# Patient Record
Sex: FEMALE | Race: WHITE | URBAN - METROPOLITAN AREA
[De-identification: names, ages, dates, MRNs, and addresses within clinical notes are randomized per-mention and may not be internally consistent; named-entity substitution may affect disease eponyms.]

---

## 2021-07-26 ENCOUNTER — OFFICE VISIT (OUTPATIENT)
Dept: URBAN - METROPOLITAN AREA CLINIC 94 | Facility: CLINIC | Age: 56
End: 2021-07-26
Payer: COMMERCIAL

## 2021-07-26 ENCOUNTER — DASHBOARD ENCOUNTERS (OUTPATIENT)
Age: 56
End: 2021-07-26

## 2021-07-26 ENCOUNTER — WEB ENCOUNTER (OUTPATIENT)
Dept: URBAN - METROPOLITAN AREA CLINIC 94 | Facility: CLINIC | Age: 56
End: 2021-07-26

## 2021-07-26 VITALS
WEIGHT: 139 LBS | DIASTOLIC BLOOD PRESSURE: 79 MMHG | TEMPERATURE: 99 F | SYSTOLIC BLOOD PRESSURE: 127 MMHG | HEIGHT: 63 IN | BODY MASS INDEX: 24.63 KG/M2 | HEART RATE: 79 BPM

## 2021-07-26 DIAGNOSIS — K70.31 ASCITES DUE TO ALCOHOLIC CIRRHOSIS: ICD-10-CM

## 2021-07-26 DIAGNOSIS — I85.00 ESOPHAGEAL VARICES WITHOUT BLEEDING, UNSPECIFIED ESOPHAGEAL VARICES TYPE: ICD-10-CM

## 2021-07-26 DIAGNOSIS — K74.60 UNSPECIFIED CIRRHOSIS OF LIVER: ICD-10-CM

## 2021-07-26 PROCEDURE — 99244 OFF/OP CNSLTJ NEW/EST MOD 40: CPT | Performed by: INTERNAL MEDICINE

## 2021-07-26 PROCEDURE — 74177 CT ABD & PELVIS W/CONTRAST: CPT | Performed by: INTERNAL MEDICINE

## 2021-07-26 RX ORDER — POTASSIUM CHLORIDE 7.46 G/1000ML
AS DIRECTED INJECTION, SOLUTION INTRAVENOUS
Status: ACTIVE | COMMUNITY

## 2021-07-26 RX ORDER — PROPRANOLOL HYDROCHLORIDE 20 MG/1
1 TABLET TABLET ORAL ONCE A DAY
Status: ACTIVE | COMMUNITY

## 2021-07-26 RX ORDER — SPIRONOLACTONE 50 MG/1
1 TABLET TABLET, FILM COATED ORAL ONCE A DAY
Status: ACTIVE | COMMUNITY

## 2021-07-26 RX ORDER — PANTOPRAZOLE SODIUM 40 MG/1
1 TABLET TABLET, DELAYED RELEASE ORAL ONCE A DAY
Status: ACTIVE | COMMUNITY

## 2021-07-26 NOTE — PHYSICAL EXAM GASTROINTESTINAL
Abdomen diffuse tender, moderately distended , no guarding or rigidity , no masses palpable , normal bowel sounds , Liver and Spleen , hepatomegaly present , liver nontender , spleen not palpable

## 2021-07-26 NOTE — PHYSICAL EXAM CONSTITUTIONAL:
ill appearing , in no acute distress , ambulating without difficulty , normal communication ability

## 2021-07-26 NOTE — HPI-TODAY'S VISIT:
55 yo F accompanied by her  with hx of liver cirrhosis with hx of ETOH use drinking 6pk beer/day per [facility] records which were reviewed prior to office visit. Patient is being referrd by her PCP, DR Sagar Butt and copy of records will be faxed to his office for review.  Pt reports since May, she has developed significant ascites requiring weekly paracentesis. She has had to present to the ER for this and is hoping to get an order to have paracentesis as an outpatient.   The patient denies current ETOH use. She is currently taking Spirinolactone 50 mg, potassium 10 meq, Pantoprazole 40 mg and Mg. Pt and spouse are unable to previous hx.   Pt reports abdominal pain, large volume ascites. Denies N/V or itching, or changes in bowel habits, melena or hematochezia.    Pt was seeing GI DR Corbin Peralta, Colonoscopy 2017 normal Repeat in 10 yrs EGD 8/2017 Gastritis  Esophageal varices without bleeding, grade 1 nonbandable, Gastritis Helicobacter negative  Negative for malignancy  No new imaging noted in [facility] records other than US for paracentesis . Last paracentesis 7/16 7 L removed. Pt has been receiving Albumin 50 gm following paracentesis.   Pt denies NSAID use. Additional PMH HTN and arthritis. She believes that she received Hep A/B vaccines.

## 2021-07-26 NOTE — PHYSICAL EXAM HENT:
Head, normocephalic, atraumatic, Face erthematous dime size nodular lesion over left cheek, Ears, External ears within normal limits, Nose/Nasopharynx, External nose normal appearance, nares patent, no nasal discharge, Mouth and Throat, Oral cavity appearance normal, Breath odor normal, Lips, Appearance normal

## 2021-07-27 ENCOUNTER — TELEPHONE ENCOUNTER (OUTPATIENT)
Dept: URBAN - METROPOLITAN AREA CLINIC 94 | Facility: CLINIC | Age: 56
End: 2021-07-27

## 2021-07-27 PROBLEM — 1082601000119104: Status: ACTIVE | Noted: 2021-07-26

## 2021-07-27 PROBLEM — 14223005: Status: ACTIVE | Noted: 2021-07-26

## 2021-07-27 PROBLEM — 19943007: Status: ACTIVE | Noted: 2021-07-26

## 2021-07-29 ENCOUNTER — TELEPHONE ENCOUNTER (OUTPATIENT)
Dept: URBAN - METROPOLITAN AREA CLINIC 94 | Facility: CLINIC | Age: 56
End: 2021-07-29

## 2021-08-02 ENCOUNTER — TELEPHONE ENCOUNTER (OUTPATIENT)
Dept: URBAN - METROPOLITAN AREA CLINIC 94 | Facility: CLINIC | Age: 56
End: 2021-08-02

## 2021-08-10 ENCOUNTER — LAB OUTSIDE AN ENCOUNTER (OUTPATIENT)
Dept: URBAN - METROPOLITAN AREA CLINIC 94 | Facility: CLINIC | Age: 56
End: 2021-08-10

## 2021-08-24 ENCOUNTER — LAB OUTSIDE AN ENCOUNTER (OUTPATIENT)
Dept: URBAN - METROPOLITAN AREA CLINIC 94 | Facility: CLINIC | Age: 56
End: 2021-08-24

## 2021-09-07 ENCOUNTER — LAB OUTSIDE AN ENCOUNTER (OUTPATIENT)
Dept: URBAN - METROPOLITAN AREA CLINIC 94 | Facility: CLINIC | Age: 56
End: 2021-09-07

## 2021-09-13 ENCOUNTER — OFFICE VISIT (OUTPATIENT)
Dept: URBAN - METROPOLITAN AREA CLINIC 94 | Facility: CLINIC | Age: 56
End: 2021-09-13

## 2021-09-21 ENCOUNTER — LAB OUTSIDE AN ENCOUNTER (OUTPATIENT)
Dept: URBAN - METROPOLITAN AREA CLINIC 94 | Facility: CLINIC | Age: 56
End: 2021-09-21

## 2021-10-05 ENCOUNTER — LAB OUTSIDE AN ENCOUNTER (OUTPATIENT)
Dept: URBAN - METROPOLITAN AREA CLINIC 94 | Facility: CLINIC | Age: 56
End: 2021-10-05

## 2021-10-19 ENCOUNTER — LAB OUTSIDE AN ENCOUNTER (OUTPATIENT)
Dept: URBAN - METROPOLITAN AREA CLINIC 94 | Facility: CLINIC | Age: 56
End: 2021-10-19